# Patient Record
Sex: FEMALE
[De-identification: names, ages, dates, MRNs, and addresses within clinical notes are randomized per-mention and may not be internally consistent; named-entity substitution may affect disease eponyms.]

---

## 2019-12-06 ENCOUNTER — OTHER (OUTPATIENT)
Age: 84
End: 2019-12-06

## 2019-12-06 ENCOUNTER — APPOINTMENT (OUTPATIENT)
Dept: GASTROENTEROLOGY | Facility: CLINIC | Age: 84
End: 2019-12-06
Payer: MEDICARE

## 2019-12-06 VITALS
WEIGHT: 119 LBS | SYSTOLIC BLOOD PRESSURE: 130 MMHG | OXYGEN SATURATION: 98 % | HEART RATE: 76 BPM | TEMPERATURE: 98.4 F | HEIGHT: 61 IN | BODY MASS INDEX: 22.47 KG/M2 | DIASTOLIC BLOOD PRESSURE: 60 MMHG

## 2019-12-06 DIAGNOSIS — E11.9 TYPE 2 DIABETES MELLITUS W/OUT COMPLICATIONS: ICD-10-CM

## 2019-12-06 DIAGNOSIS — D64.9 ANEMIA, UNSPECIFIED: ICD-10-CM

## 2019-12-06 DIAGNOSIS — D50.0 IRON DEFICIENCY ANEMIA SECONDARY TO BLOOD LOSS (CHRONIC): ICD-10-CM

## 2019-12-06 DIAGNOSIS — R63.0 ANOREXIA: ICD-10-CM

## 2019-12-06 DIAGNOSIS — E78.00 PURE HYPERCHOLESTEROLEMIA, UNSPECIFIED: ICD-10-CM

## 2019-12-06 DIAGNOSIS — R63.4 ABNORMAL WEIGHT LOSS: ICD-10-CM

## 2019-12-06 PROBLEM — Z00.00 ENCOUNTER FOR PREVENTIVE HEALTH EXAMINATION: Status: ACTIVE | Noted: 2019-12-06

## 2019-12-06 PROCEDURE — 99203 OFFICE O/P NEW LOW 30 MIN: CPT

## 2019-12-06 RX ORDER — UBIDECARENONE/VIT E ACET 100MG-5
1000 CAPSULE ORAL
Refills: 0 | Status: ACTIVE | COMMUNITY

## 2019-12-06 RX ORDER — SODIUM SULFATE, POTASSIUM SULFATE, MAGNESIUM SULFATE 17.5; 3.13; 1.6 G/ML; G/ML; G/ML
17.5-3.13-1.6 SOLUTION, CONCENTRATE ORAL
Qty: 1 | Refills: 0 | Status: ACTIVE | COMMUNITY
Start: 2019-12-06

## 2019-12-06 RX ORDER — ASPIRIN 81 MG/1
81 TABLET, COATED ORAL
Refills: 0 | Status: ACTIVE | COMMUNITY

## 2019-12-06 RX ORDER — LEVOTHYROXINE SODIUM 100 UG/1
100 TABLET ORAL
Refills: 0 | Status: ACTIVE | COMMUNITY

## 2019-12-06 RX ORDER — GLIPIZIDE 2.5 MG/1
TABLET ORAL
Refills: 0 | Status: ACTIVE | COMMUNITY

## 2019-12-06 RX ORDER — METFORMIN HYDROCHLORIDE 500 MG/1
500 TABLET, FILM COATED, EXTENDED RELEASE ORAL
Refills: 0 | Status: ACTIVE | COMMUNITY

## 2019-12-06 RX ORDER — TIMOLOL MALEATE 5 MG/ML
0.5 SOLUTION/ DROPS OPHTHALMIC
Refills: 0 | Status: ACTIVE | COMMUNITY

## 2019-12-06 RX ORDER — AMLODIPINE BESYLATE AND VALSARTAN 5; 160 MG/1; MG/1
5-160 TABLET, FILM COATED ORAL
Refills: 0 | Status: ACTIVE | COMMUNITY

## 2019-12-06 RX ORDER — LATANOPROST/PF 0.005 %
0.01 DROPS OPHTHALMIC (EYE)
Refills: 0 | Status: ACTIVE | COMMUNITY

## 2019-12-06 RX ORDER — POTASSIUM CHLORIDE 1.5 G/1.58G
20 POWDER, FOR SOLUTION ORAL
Refills: 0 | Status: ACTIVE | COMMUNITY

## 2019-12-06 RX ORDER — ATORVASTATIN CALCIUM 10 MG/1
10 TABLET, FILM COATED ORAL
Refills: 0 | Status: ACTIVE | COMMUNITY

## 2019-12-06 NOTE — HISTORY OF PRESENT ILLNESS
[de-identified] : Rena Hanson MD\par [address] [facility]\[place] [address]\par \par Very pleasant 86-year-old female who is in fairly good health\par \par She looks much other than his stated age and is very active\par \par She has an iron deficiency anemia with an iron saturation of 6%\par \par She is here to schedule both upper endoscopy and colonoscopy\par \par No heartburn or indigestion\par \par No dysphasia\par \par No abdominal pain\par \par No nausea vomiting\par \par No weight loss\par \par No change in bowel movements\par \par No blood per rectum\par \par No family history of intestinal cancer

## 2019-12-06 NOTE — REASON FOR VISIT
[Initial Evaluation] : an initial evaluation [FreeTextEntry1] : The patient has an iron deficiency anemia with an iron saturation of 6%

## 2019-12-06 NOTE — PHYSICAL EXAM
[General Appearance - Alert] : alert [General Appearance - Well Nourished] : well nourished [General Appearance - In No Acute Distress] : in no acute distress [General Appearance - Well Developed] : well developed [Sclera] : the sclera and conjunctiva were normal [General Appearance - Well-Appearing] : healthy appearing [Neck Cervical Mass (___cm)] : no neck mass was observed [Jugular Venous Distention Increased] : there was no jugular-venous distention [Neck Appearance] : the appearance of the neck was normal [Auscultation Breath Sounds / Voice Sounds] : lungs were clear to auscultation bilaterally [Heart Rate And Rhythm] : heart rate was normal and rhythm regular [Heart Sounds] : normal S1 and S2 [Heart Sounds Gallop] : no gallops [Edema] : there was no peripheral edema [Full Pulse] : the pedal pulses are present [Bowel Sounds] : normal bowel sounds [Abdomen Tenderness] : non-tender [Abdomen Mass (___ Cm)] : no abdominal mass palpated [Abdomen Soft] : soft [Patient Refused] : rectal exam was refused by the patient [Cervical Lymph Nodes Enlarged Posterior Bilaterally] : posterior cervical [Femoral Lymph Nodes Enlarged Bilaterally] : femoral [Axillary Lymph Nodes Enlarged Bilaterally] : axillary [Supraclavicular Lymph Nodes Enlarged Bilaterally] : supraclavicular [Cervical Lymph Nodes Enlarged Anterior Bilaterally] : anterior cervical [Inguinal Lymph Nodes Enlarged Bilaterally] : inguinal [No CVA Tenderness] : no ~M costovertebral angle tenderness [No Spinal Tenderness] : no spinal tenderness [Motor Tone] : muscle strength and tone were normal [Nail Clubbing] : no clubbing  or cyanosis of the fingernails [Abnormal Walk] : normal gait [Musculoskeletal - Swelling] : no joint swelling seen [] : no rash [Skin Turgor] : normal skin turgor [Skin Color & Pigmentation] : normal skin color and pigmentation [Impaired Insight] : insight and judgment were intact [Affect] : the affect was normal [Oriented To Time, Place, And Person] : oriented to person, place, and time [No Focal Deficits] : no focal deficits

## 2019-12-06 NOTE — ASSESSMENT
[FreeTextEntry1] : Impression\par \par Iron deficiency anemia\par \par The patient is having a routine cardiac stress test next week\par \par Suggest\par \par Cardiac clearance\par \par Anesthesia clearance\par \par Colonoscopy and upper endoscopy\par \par Suprep\par \par Begin taking iron\par \par The laxative, or its risks benefits and alternatives have been thoroughly reviewed with the patient in great detail. The laxative instructions have been reviewed in great detail with the patient.\par \par Risks/benefits:\par The procedure, the risks and benefits and alternatives have been reviewed in great detail with the patient.  Risks including, but not limited to sedation such as cardiac and pulmonary compromise, the procedure itself such as bleeding requiring hospitalization, transfusion, surgery, temporary or permanent colostomy.  Perforation or puncture of the requiring hospitalization, surgery, temporary colostomy.\par It has been explained to the patient that though colonoscopy is thought to be the best screening exam for colon cancer and polyps, no screening exam can find all colon polyps or cancers.  \par The patient expresses understanding of the procedure and consents to undergoing the procedure.\par \par

## 2023-02-06 ENCOUNTER — OFFICE (OUTPATIENT)
Dept: URBAN - METROPOLITAN AREA CLINIC 35 | Facility: CLINIC | Age: 88
Setting detail: OPHTHALMOLOGY
End: 2023-02-06
Payer: MEDICARE

## 2023-02-06 DIAGNOSIS — H40.1113: ICD-10-CM

## 2023-02-06 DIAGNOSIS — H43.811: ICD-10-CM

## 2023-02-06 DIAGNOSIS — H40.1123: ICD-10-CM

## 2023-02-06 DIAGNOSIS — H02.61: ICD-10-CM

## 2023-02-06 DIAGNOSIS — H02.64: ICD-10-CM

## 2023-02-06 PROCEDURE — 99213 OFFICE O/P EST LOW 20 MIN: CPT | Performed by: OPHTHALMOLOGY

## 2023-02-06 ASSESSMENT — REFRACTION_CURRENTRX
OS_VPRISM_DIRECTION: PROGS
OS_AXIS: 160
OD_OVR_VA: 20/
OD_ADD: +3.50
OD_SPHERE: -2.75
OS_OVR_VA: 20/
OD_CYLINDER: +2.50
OS_CYLINDER: +1.00
OS_ADD: +3.50
OD_VPRISM_DIRECTION: PROGS
OS_SPHERE: -0.75
OD_AXIS: 008

## 2023-02-06 ASSESSMENT — CONFRONTATIONAL VISUAL FIELD TEST (CVF)
OD_FINDINGS: FULL
OS_FINDINGS: FULL

## 2023-02-06 ASSESSMENT — REFRACTION_MANIFEST
OS_VA1: 20/25
OS_ADD: +3.50
OD_VA1: 20/25
OS_AXIS: 170
OD_ADD: +3.50
OD_AXIS: 015
OS_CYLINDER: +1.50
OD_SPHERE: -3.25
OD_CYLINDER: +2.50
OD_AXIS: 010
OD_SPHERE: -2.75
OD_ADD: +3.50
OS_ADD: +3.50
OS_SPHERE: -0.50
OS_CYLINDER: +1.00
OS_SPHERE: -0.75
OS_AXIS: 170
OD_CYLINDER: +2.50

## 2023-02-06 ASSESSMENT — SPHEQUIV_DERIVED
OS_SPHEQUIV: 0.25
OD_SPHEQUIV: -1.875
OS_SPHEQUIV: 0.25
OD_SPHEQUIV: -2
OS_SPHEQUIV: -0.25
OD_SPHEQUIV: -1.5

## 2023-02-06 ASSESSMENT — REFRACTION_AUTOREFRACTION
OD_AXIS: 020
OD_CYLINDER: +2.75
OS_SPHERE: -0.50
OD_SPHERE: -3.25
OS_AXIS: 165
OS_CYLINDER: +1.50

## 2023-02-06 ASSESSMENT — PACHYMETRY
OS_CT_UM: 550
OS_CT_CORRECTION: -1
OD_CT_UM: 558
OD_CT_CORRECTION: -1

## 2023-02-06 ASSESSMENT — KERATOMETRY
OS_K2POWER_DIOPTERS: 42.00
METHOD_AUTO_MANUAL: AUTO
OD_K2POWER_DIOPTERS: 42.75
OD_AXISANGLE_DEGREES: 018
OS_AXISANGLE_DEGREES: 149
OS_K1POWER_DIOPTERS: 41.50
OD_K1POWER_DIOPTERS: 40.75

## 2023-02-06 ASSESSMENT — AXIALLENGTH_DERIVED
OD_AL: 24.8864
OD_AL: 25.1051
OS_AL: 24.1502
OS_AL: 24.1502
OD_AL: 25.0501
OS_AL: 24.3561

## 2023-02-06 ASSESSMENT — TONOMETRY
OS_IOP_MMHG: 11
OD_IOP_MMHG: 11

## 2023-02-06 ASSESSMENT — LID EXAM ASSESSMENTS
OS_COMMENTS: BLEPHAROCHALASIS WITH HOODING
OD_COMMENTS: BLEPHAROCHALASIS WITH HOODING

## 2023-02-06 ASSESSMENT — VISUAL ACUITY
OS_BCVA: 20/30+2
OD_BCVA: 20/40+2

## 2023-06-06 ENCOUNTER — OFFICE (OUTPATIENT)
Dept: URBAN - METROPOLITAN AREA CLINIC 35 | Facility: CLINIC | Age: 88
Setting detail: OPHTHALMOLOGY
End: 2023-06-06
Payer: MEDICARE

## 2023-06-06 DIAGNOSIS — H02.34: ICD-10-CM

## 2023-06-06 DIAGNOSIS — H40.1113: ICD-10-CM

## 2023-06-06 DIAGNOSIS — H02.31: ICD-10-CM

## 2023-06-06 DIAGNOSIS — H40.1123: ICD-10-CM

## 2023-06-06 PROCEDURE — 99213 OFFICE O/P EST LOW 20 MIN: CPT | Performed by: OPHTHALMOLOGY

## 2023-06-06 PROCEDURE — 92133 CPTRZD OPH DX IMG PST SGM ON: CPT | Performed by: OPHTHALMOLOGY

## 2023-06-06 ASSESSMENT — REFRACTION_AUTOREFRACTION
OS_CYLINDER: +2.00
OD_SPHERE: -3.00
OS_AXIS: 165
OS_SPHERE: -0.50
OD_CYLINDER: +2.75
OD_AXIS: 015

## 2023-06-06 ASSESSMENT — AXIALLENGTH_DERIVED
OS_AL: 24.3561
OD_AL: 24.9407
OD_AL: 25.1051
OS_AL: 24.1502
OD_AL: 24.8864
OS_AL: 24.0486

## 2023-06-06 ASSESSMENT — VISUAL ACUITY
OS_BCVA: 20/30+2
OD_BCVA: 20/40+

## 2023-06-06 ASSESSMENT — REFRACTION_MANIFEST
OS_VA1: 20/25
OS_ADD: +3.50
OS_SPHERE: -0.75
OD_SPHERE: -3.25
OS_CYLINDER: +1.50
OD_CYLINDER: +2.50
OD_AXIS: 010
OS_AXIS: 170
OS_ADD: +3.50
OD_VA1: 20/25
OD_ADD: +3.50
OD_SPHERE: -2.75
OD_ADD: +3.50
OS_AXIS: 170
OD_AXIS: 015
OD_CYLINDER: +2.50
OS_CYLINDER: +1.00
OS_SPHERE: -0.50

## 2023-06-06 ASSESSMENT — REFRACTION_CURRENTRX
OD_OVR_VA: 20/
OS_ADD: +3.50
OD_VPRISM_DIRECTION: PROGS
OS_AXIS: 160
OD_CYLINDER: +2.50
OS_OVR_VA: 20/
OD_SPHERE: -2.75
OS_VPRISM_DIRECTION: PROGS
OD_ADD: +3.50
OS_SPHERE: -0.75
OS_CYLINDER: +1.00
OD_AXIS: 008

## 2023-06-06 ASSESSMENT — KERATOMETRY
METHOD_AUTO_MANUAL: AUTO
OS_K1POWER_DIOPTERS: 41.50
OS_K2POWER_DIOPTERS: 42.00
OD_K2POWER_DIOPTERS: 42.75
OD_AXISANGLE_DEGREES: 018
OD_K1POWER_DIOPTERS: 40.75
OS_AXISANGLE_DEGREES: 149

## 2023-06-06 ASSESSMENT — LID POSITION - COMMENTS
OS_COMMENTS: BLEPHAROCHALASIS WITH HOODING
OD_COMMENTS: BLEPHAROCHALASIS WITH HOODING

## 2023-06-06 ASSESSMENT — CONFRONTATIONAL VISUAL FIELD TEST (CVF)
OD_FINDINGS: FULL
OS_FINDINGS: FULL

## 2023-06-06 ASSESSMENT — SPHEQUIV_DERIVED
OS_SPHEQUIV: -0.25
OD_SPHEQUIV: -1.625
OD_SPHEQUIV: -2
OS_SPHEQUIV: 0.25
OS_SPHEQUIV: 0.5
OD_SPHEQUIV: -1.5

## 2023-06-06 ASSESSMENT — PACHYMETRY
OS_CT_UM: 550
OS_CT_CORRECTION: -1
OD_CT_UM: 558
OD_CT_CORRECTION: -1

## 2023-06-06 ASSESSMENT — TONOMETRY
OD_IOP_MMHG: 13
OS_IOP_MMHG: 14

## 2023-10-09 ENCOUNTER — OFFICE (OUTPATIENT)
Dept: URBAN - METROPOLITAN AREA CLINIC 35 | Facility: CLINIC | Age: 88
Setting detail: OPHTHALMOLOGY
End: 2023-10-09
Payer: MEDICARE

## 2023-10-09 DIAGNOSIS — H26.492: ICD-10-CM

## 2023-10-09 DIAGNOSIS — E11.9: ICD-10-CM

## 2023-10-09 DIAGNOSIS — H02.34: ICD-10-CM

## 2023-10-09 DIAGNOSIS — H40.1113: ICD-10-CM

## 2023-10-09 DIAGNOSIS — H05.20: ICD-10-CM

## 2023-10-09 DIAGNOSIS — H40.1123: ICD-10-CM

## 2023-10-09 DIAGNOSIS — H35.411: ICD-10-CM

## 2023-10-09 DIAGNOSIS — H11.153: ICD-10-CM

## 2023-10-09 DIAGNOSIS — H35.373: ICD-10-CM

## 2023-10-09 DIAGNOSIS — H02.31: ICD-10-CM

## 2023-10-09 DIAGNOSIS — H18.413: ICD-10-CM

## 2023-10-09 DIAGNOSIS — H43.811: ICD-10-CM

## 2023-10-09 PROCEDURE — 92014 COMPRE OPH EXAM EST PT 1/>: CPT | Performed by: OPHTHALMOLOGY

## 2023-10-09 PROCEDURE — 92250 FUNDUS PHOTOGRAPHY W/I&R: CPT | Performed by: OPHTHALMOLOGY

## 2023-10-09 ASSESSMENT — REFRACTION_AUTOREFRACTION
OD_CYLINDER: +2.75
OS_CYLINDER: +1.75
OS_AXIS: 169
OD_AXIS: 015
OD_SPHERE: -3.00
OS_SPHERE: -0.50

## 2023-10-09 ASSESSMENT — REFRACTION_CURRENTRX
OS_VPRISM_DIRECTION: PROGS
OD_AXIS: 011
OD_ADD: +3.50
OD_OVR_VA: 20/
OD_OVR_VA: 20/
OD_SPHERE: -2.75
OD_VPRISM_DIRECTION: PROGS
OS_AXIS: 162
OS_OVR_VA: 20/
OS_SPHERE: -0.75
OS_CYLINDER: +1.00
OS_ADD: +3.50
OD_CYLINDER: +2.50
OS_OVR_VA: 20/

## 2023-10-09 ASSESSMENT — CONFRONTATIONAL VISUAL FIELD TEST (CVF)
OS_FINDINGS: FULL
OD_FINDINGS: FULL

## 2023-10-09 ASSESSMENT — SPHEQUIV_DERIVED
OS_SPHEQUIV: -0.25
OS_SPHEQUIV: 0.375
OS_SPHEQUIV: 0.25
OD_SPHEQUIV: -1.625
OD_SPHEQUIV: -1.5
OD_SPHEQUIV: -2

## 2023-10-09 ASSESSMENT — REFRACTION_MANIFEST
OD_SPHERE: -2.75
OD_AXIS: 010
OS_AXIS: 170
OD_AXIS: 015
OS_ADD: +3.50
OS_ADD: +3.50
OS_VA1: 20/25
OD_SPHERE: -3.25
OS_AXIS: 170
OD_VA1: 20/25
OD_ADD: +3.50
OD_CYLINDER: +2.50
OS_CYLINDER: +1.50
OD_CYLINDER: +2.50
OS_SPHERE: -0.75
OS_CYLINDER: +1.00
OD_ADD: +3.50
OS_SPHERE: -0.50

## 2023-10-09 ASSESSMENT — KERATOMETRY
OD_AXISANGLE_DEGREES: 020
OS_K2POWER_DIOPTERS: 41.75
OD_K1POWER_DIOPTERS: 40.75
METHOD_AUTO_MANUAL: AUTO
OS_AXISANGLE_DEGREES: 167
OS_K1POWER_DIOPTERS: 41.25
OD_K2POWER_DIOPTERS: 42.50

## 2023-10-09 ASSESSMENT — PACHYMETRY
OS_CT_CORRECTION: -1
OD_CT_CORRECTION: -1
OS_CT_UM: 550
OD_CT_UM: 558

## 2023-10-09 ASSESSMENT — AXIALLENGTH_DERIVED
OD_AL: 24.9922
OD_AL: 25.1572
OS_AL: 24.2468
OS_AL: 24.4543
OD_AL: 24.9376
OS_AL: 24.1955

## 2023-10-09 ASSESSMENT — LID POSITION - COMMENTS
OS_COMMENTS: BLEPHAROCHALASIS WITH HOODING
OD_COMMENTS: BLEPHAROCHALASIS WITH HOODING

## 2023-10-09 ASSESSMENT — TONOMETRY
OD_IOP_MMHG: 15
OS_IOP_MMHG: 15

## 2023-10-09 ASSESSMENT — VISUAL ACUITY
OS_BCVA: 20/25-3
OD_BCVA: 20/50-

## 2024-02-26 ENCOUNTER — OFFICE (OUTPATIENT)
Dept: URBAN - METROPOLITAN AREA CLINIC 35 | Facility: CLINIC | Age: 89
Setting detail: OPHTHALMOLOGY
End: 2024-02-26
Payer: MEDICARE

## 2024-02-26 DIAGNOSIS — H05.20: ICD-10-CM

## 2024-02-26 DIAGNOSIS — H18.413: ICD-10-CM

## 2024-02-26 DIAGNOSIS — H40.1123: ICD-10-CM

## 2024-02-26 DIAGNOSIS — H02.34: ICD-10-CM

## 2024-02-26 DIAGNOSIS — H11.153: ICD-10-CM

## 2024-02-26 DIAGNOSIS — H26.492: ICD-10-CM

## 2024-02-26 DIAGNOSIS — H40.1113: ICD-10-CM

## 2024-02-26 DIAGNOSIS — H02.61: ICD-10-CM

## 2024-02-26 DIAGNOSIS — H02.31: ICD-10-CM

## 2024-02-26 DIAGNOSIS — H02.64: ICD-10-CM

## 2024-02-26 PROCEDURE — 99213 OFFICE O/P EST LOW 20 MIN: CPT | Performed by: OPHTHALMOLOGY

## 2024-02-26 ASSESSMENT — SPHEQUIV_DERIVED
OS_SPHEQUIV: 0.25
OS_SPHEQUIV: -0.25
OD_SPHEQUIV: -1.5
OS_SPHEQUIV: 0.25
OD_SPHEQUIV: -2
OD_SPHEQUIV: -1.875

## 2024-02-26 ASSESSMENT — REFRACTION_MANIFEST
OD_SPHERE: -2.75
OS_ADD: +3.50
OD_AXIS: 010
OD_ADD: +3.50
OD_CYLINDER: +2.50
OD_AXIS: 015
OD_SPHERE: -3.25
OS_SPHERE: -0.75
OD_VA1: 20/25
OS_SPHERE: -0.50
OS_CYLINDER: +1.50
OS_VA1: 20/25
OS_ADD: +3.50
OS_CYLINDER: +1.00
OD_ADD: +3.50
OS_AXIS: 170
OS_AXIS: 170
OD_CYLINDER: +2.50

## 2024-02-26 ASSESSMENT — REFRACTION_CURRENTRX
OS_VPRISM_DIRECTION: PROGS
OS_ADD: +3.50
OS_OVR_VA: 20/
OD_OVR_VA: 20/
OD_VPRISM_DIRECTION: PROGS
OD_SPHERE: -2.75
OS_AXIS: 162
OS_SPHERE: -0.75
OS_CYLINDER: +1.00
OD_AXIS: 011
OD_ADD: +3.50
OD_CYLINDER: +2.50

## 2024-02-26 ASSESSMENT — REFRACTION_AUTOREFRACTION
OS_CYLINDER: +1.50
OS_SPHERE: -0.50
OD_CYLINDER: +2.75
OS_AXIS: 169
OD_SPHERE: -3.25
OD_AXIS: 010

## 2024-02-26 ASSESSMENT — CONFRONTATIONAL VISUAL FIELD TEST (CVF)
OS_FINDINGS: FULL
OD_FINDINGS: FULL

## 2024-02-26 ASSESSMENT — LID POSITION - COMMENTS
OD_COMMENTS: BLEPHAROCHALASIS
OS_COMMENTS: BLEPHAROCHALASIS

## 2024-06-27 ENCOUNTER — OFFICE (OUTPATIENT)
Dept: URBAN - METROPOLITAN AREA CLINIC 35 | Facility: CLINIC | Age: 89
Setting detail: OPHTHALMOLOGY
End: 2024-06-27
Payer: MEDICARE

## 2024-06-27 DIAGNOSIS — H26.492: ICD-10-CM

## 2024-06-27 DIAGNOSIS — H02.64: ICD-10-CM

## 2024-06-27 DIAGNOSIS — H18.413: ICD-10-CM

## 2024-06-27 DIAGNOSIS — H02.34: ICD-10-CM

## 2024-06-27 DIAGNOSIS — H40.1113: ICD-10-CM

## 2024-06-27 DIAGNOSIS — H05.20: ICD-10-CM

## 2024-06-27 DIAGNOSIS — H02.31: ICD-10-CM

## 2024-06-27 DIAGNOSIS — H02.61: ICD-10-CM

## 2024-06-27 DIAGNOSIS — H40.1123: ICD-10-CM

## 2024-06-27 DIAGNOSIS — H11.153: ICD-10-CM

## 2024-06-27 PROCEDURE — 92133 CPTRZD OPH DX IMG PST SGM ON: CPT | Performed by: OPHTHALMOLOGY

## 2024-06-27 PROCEDURE — 99213 OFFICE O/P EST LOW 20 MIN: CPT | Performed by: OPHTHALMOLOGY

## 2024-06-27 ASSESSMENT — LID POSITION - COMMENTS
OS_COMMENTS: BLEPHAROCHALASIS
OD_COMMENTS: BLEPHAROCHALASIS

## 2024-10-28 ENCOUNTER — OFFICE (OUTPATIENT)
Dept: URBAN - METROPOLITAN AREA CLINIC 35 | Facility: CLINIC | Age: 89
Setting detail: OPHTHALMOLOGY
End: 2024-10-28
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
## 2024-10-28 PROCEDURE — NO SHOW FE NO SHOW FEE: Performed by: OPHTHALMOLOGY

## 2024-12-18 ENCOUNTER — DOCTOR'S OFFICE (OUTPATIENT)
Facility: LOCATION | Age: 89
Setting detail: OPHTHALMOLOGY
End: 2024-12-18
Payer: MEDICARE

## 2024-12-18 DIAGNOSIS — H11.153: ICD-10-CM

## 2024-12-18 DIAGNOSIS — H43.811: ICD-10-CM

## 2024-12-18 DIAGNOSIS — H18.413: ICD-10-CM

## 2024-12-18 DIAGNOSIS — H02.31: ICD-10-CM

## 2024-12-18 DIAGNOSIS — H40.1113: ICD-10-CM

## 2024-12-18 DIAGNOSIS — H02.34: ICD-10-CM

## 2024-12-18 DIAGNOSIS — H35.373: ICD-10-CM

## 2024-12-18 DIAGNOSIS — H05.20: ICD-10-CM

## 2024-12-18 DIAGNOSIS — H35.411: ICD-10-CM

## 2024-12-18 DIAGNOSIS — H40.1123: ICD-10-CM

## 2024-12-18 DIAGNOSIS — H26.492: ICD-10-CM

## 2024-12-18 DIAGNOSIS — E11.9: ICD-10-CM

## 2024-12-18 PROCEDURE — 92014 COMPRE OPH EXAM EST PT 1/>: CPT | Performed by: OPHTHALMOLOGY

## 2024-12-18 ASSESSMENT — REFRACTION_MANIFEST
OS_AXIS: 170
OS_SPHERE: -0.75
OD_SPHERE: -3.25
OD_AXIS: 015
OD_CYLINDER: +2.50
OD_VA1: 20/25
OS_ADD: +3.50
OS_ADD: +3.50
OD_ADD: +3.50
OS_CYLINDER: +1.00
OD_SPHERE: -2.75
OD_AXIS: 010
OD_CYLINDER: +2.50
OS_VA1: 20/25
OS_SPHERE: -0.50
OS_AXIS: 170
OD_ADD: +3.50
OS_CYLINDER: +1.50

## 2024-12-18 ASSESSMENT — PACHYMETRY
OS_CT_UM: 550
OS_CT_CORRECTION: -1
OD_CT_CORRECTION: -1
OD_CT_UM: 558

## 2024-12-18 ASSESSMENT — VISUAL ACUITY
OD_BCVA: 20/25+2
OS_BCVA: 20/25+1

## 2024-12-18 ASSESSMENT — REFRACTION_CURRENTRX
OS_SPHERE: -0.75
OD_SPHERE: -2.75
OS_VPRISM_DIRECTION: PROGS
OD_CYLINDER: +2.50
OS_ADD: +3.50
OD_VPRISM_DIRECTION: PROGS
OD_AXIS: 011
OS_AXIS: 162
OD_ADD: +3.50
OS_CYLINDER: +1.00
OS_OVR_VA: 20/
OD_OVR_VA: 20/

## 2024-12-18 ASSESSMENT — REFRACTION_AUTOREFRACTION
OD_AXIS: 013
OS_SPHERE: -0.50
OS_CYLINDER: +1.25
OD_SPHERE: -3.00
OS_AXIS: 170
OD_CYLINDER: +2.75

## 2024-12-18 ASSESSMENT — LID POSITION - COMMENTS
OS_COMMENTS: BLEPHAROCHALASIS
OD_COMMENTS: BLEPHAROCHALASIS

## 2024-12-18 ASSESSMENT — CONFRONTATIONAL VISUAL FIELD TEST (CVF)
OD_FINDINGS: FULL
OS_FINDINGS: FULL

## 2024-12-18 ASSESSMENT — KERATOMETRY
OD_AXISANGLE_DEGREES: 020
OS_K2POWER_DIOPTERS: 42.25
METHOD_AUTO_MANUAL: AUTO
OS_AXISANGLE_DEGREES: 090
OD_K1POWER_DIOPTERS: 41.00
OS_K1POWER_DIOPTERS: 42.25
OD_K2POWER_DIOPTERS: 42.75

## 2024-12-18 ASSESSMENT — TONOMETRY
OS_IOP_MMHG: 11
OD_IOP_MMHG: 11

## 2025-04-16 ENCOUNTER — DOCTOR'S OFFICE (OUTPATIENT)
Facility: LOCATION | Age: OVER 89
Setting detail: OPHTHALMOLOGY
End: 2025-04-16
Payer: MEDICARE

## 2025-04-16 DIAGNOSIS — H26.492: ICD-10-CM

## 2025-04-16 DIAGNOSIS — H40.1113: ICD-10-CM

## 2025-04-16 DIAGNOSIS — H40.1123: ICD-10-CM

## 2025-04-16 PROCEDURE — 99213 OFFICE O/P EST LOW 20 MIN: CPT | Performed by: OPHTHALMOLOGY

## 2025-04-16 ASSESSMENT — REFRACTION_CURRENTRX
OS_OVR_VA: 20/
OD_OVR_VA: 20/
OS_AXIS: 162
OD_ADD: +3.50
OS_SPHERE: -1.00
OS_ADD: +3.50
OD_AXIS: 011
OD_CYLINDER: +2.50
OS_SPHERE: -0.75
OS_AXIS: 162
OD_CYLINDER: +2.50
OD_AXIS: 018
OS_ADD: +3.50
OS_CYLINDER: +1.00
OD_VPRISM_DIRECTION: PROGS
OS_VPRISM_DIRECTION: PROGS
OD_SPHERE: -2.75
OD_SPHERE: -2.75
OS_CYLINDER: +1.00
OD_OVR_VA: 20/
OD_ADD: +3.50
OS_OVR_VA: 20/

## 2025-04-16 ASSESSMENT — LID POSITION - COMMENTS
OD_COMMENTS: BLEPHAROCHALASIS
OS_COMMENTS: BLEPHAROCHALASIS

## 2025-04-16 ASSESSMENT — REFRACTION_MANIFEST
OS_ADD: +3.50
OD_CYLINDER: +2.50
OS_SPHERE: -0.50
OS_AXIS: 170
OD_SPHERE: -3.25
OD_ADD: +3.50
OD_AXIS: 010
OS_CYLINDER: +1.50
OD_ADD: +3.50
OD_CYLINDER: +2.50
OS_VA1: 20/25
OS_ADD: +3.50
OD_SPHERE: -2.75
OD_VA1: 20/25
OS_AXIS: 170
OD_AXIS: 015
OS_CYLINDER: +1.00
OS_SPHERE: -0.75

## 2025-04-16 ASSESSMENT — TONOMETRY
OD_IOP_MMHG: 12
OS_IOP_MMHG: 12

## 2025-04-16 ASSESSMENT — CONFRONTATIONAL VISUAL FIELD TEST (CVF)
OD_FINDINGS: FULL
OS_FINDINGS: FULL

## 2025-04-16 ASSESSMENT — KERATOMETRY
OS_K2POWER_DIOPTERS: 42.25
OD_K2POWER_DIOPTERS: 43.00
OD_AXISANGLE_DEGREES: 020
OD_K1POWER_DIOPTERS: 41.00
OS_AXISANGLE_DEGREES: 176
METHOD_AUTO_MANUAL: AUTO
OS_K1POWER_DIOPTERS: 41.50

## 2025-04-16 ASSESSMENT — PACHYMETRY
OS_CT_CORRECTION: -1
OD_CT_UM: 558
OD_CT_CORRECTION: -1
OS_CT_UM: 550

## 2025-04-16 ASSESSMENT — REFRACTION_AUTOREFRACTION
OS_SPHERE: -0.25
OD_SPHERE: -2.75
OS_AXIS: 172
OS_CYLINDER: +1.75
OD_AXIS: 013
OD_CYLINDER: +2.75

## 2025-04-16 ASSESSMENT — VISUAL ACUITY: OS_BCVA: 20/25+2
